# Patient Record
Sex: FEMALE | Race: AMERICAN INDIAN OR ALASKA NATIVE
[De-identification: names, ages, dates, MRNs, and addresses within clinical notes are randomized per-mention and may not be internally consistent; named-entity substitution may affect disease eponyms.]

---

## 2019-12-18 ENCOUNTER — HOSPITAL ENCOUNTER (EMERGENCY)
Dept: HOSPITAL 5 - ED | Age: 64
Discharge: HOME | End: 2019-12-18
Payer: MEDICAID

## 2019-12-18 VITALS — SYSTOLIC BLOOD PRESSURE: 165 MMHG | DIASTOLIC BLOOD PRESSURE: 77 MMHG

## 2019-12-18 DIAGNOSIS — E03.9: ICD-10-CM

## 2019-12-18 DIAGNOSIS — E11.65: Primary | ICD-10-CM

## 2019-12-18 DIAGNOSIS — I10: ICD-10-CM

## 2019-12-18 DIAGNOSIS — Z88.6: ICD-10-CM

## 2019-12-18 LAB
BASOPHILS # (AUTO): 0.1 K/MM3 (ref 0–0.1)
BASOPHILS NFR BLD AUTO: 0.8 % (ref 0–1.8)
BILIRUB UR QL STRIP: (no result)
BLOOD UR QL VISUAL: (no result)
BUN SERPL-MCNC: 12 MG/DL (ref 7–17)
BUN/CREAT SERPL: 17 %
CALCIUM SERPL-MCNC: 9.6 MG/DL (ref 8.4–10.2)
EOSINOPHIL # BLD AUTO: 0.1 K/MM3 (ref 0–0.4)
EOSINOPHIL NFR BLD AUTO: 1.3 % (ref 0–4.3)
HCT VFR BLD CALC: 39 % (ref 30.3–42.9)
HEMOLYSIS INDEX: 22
HGB BLD-MCNC: 12.8 GM/DL (ref 10.1–14.3)
LYMPHOCYTES # BLD AUTO: 2.5 K/MM3 (ref 1.2–5.4)
LYMPHOCYTES NFR BLD AUTO: 40.8 % (ref 13.4–35)
MCHC RBC AUTO-ENTMCNC: 33 % (ref 30–34)
MCV RBC AUTO: 81 FL (ref 79–97)
MONOCYTES # (AUTO): 0.5 K/MM3 (ref 0–0.8)
MONOCYTES % (AUTO): 8.1 % (ref 0–7.3)
PH UR STRIP: 5 [PH] (ref 5–7)
PLATELET # BLD: 240 K/MM3 (ref 140–440)
PROT UR STRIP-MCNC: (no result) MG/DL
RBC # BLD AUTO: 4.83 M/MM3 (ref 3.65–5.03)
RBC #/AREA URNS HPF: 4 /HPF (ref 0–6)
UROBILINOGEN UR-MCNC: < 2 MG/DL (ref ?–2)
WBC #/AREA URNS HPF: 8 /HPF (ref 0–6)

## 2019-12-18 PROCEDURE — 80048 BASIC METABOLIC PNL TOTAL CA: CPT

## 2019-12-18 PROCEDURE — 99283 EMERGENCY DEPT VISIT LOW MDM: CPT

## 2019-12-18 PROCEDURE — 81001 URINALYSIS AUTO W/SCOPE: CPT

## 2019-12-18 PROCEDURE — 96360 HYDRATION IV INFUSION INIT: CPT

## 2019-12-18 PROCEDURE — 82962 GLUCOSE BLOOD TEST: CPT

## 2019-12-18 PROCEDURE — 82805 BLOOD GASES W/O2 SATURATION: CPT

## 2019-12-18 PROCEDURE — 85025 COMPLETE CBC W/AUTO DIFF WBC: CPT

## 2019-12-18 PROCEDURE — 36415 COLL VENOUS BLD VENIPUNCTURE: CPT

## 2019-12-18 NOTE — EMERGENCY DEPARTMENT REPORT
ED General Adult HPI





- General


Chief complaint: Hyperglycemia


Stated complaint: BLOOD SUGAR LEVELS


Time Seen by Provider: 12/18/19 14:52


Source: patient


Mode of arrival: Ambulatory


Limitations: No Limitations





- History of Present Illness


Initial comments: 





Patient is a 64-year-old  female who is presenting with hyperglycemia.  

Patient states prior to arrival her blood glucose.  499.  Patient has had some 

fatigue urinary frequency and increased thirst.  Patient just started taking 

Lantus several days ago.  Patient did not compliant with her medication for 

approximately 1 year.  Patient started on his medication however the patient was

urged to come to the emergency department she started to feel ill.  Patient's 

patient is denying any fevers chills cough cold congestion at this time.





- Related Data


                                    Allergies











Allergy/AdvReac Type Severity Reaction Status Date / Time


 


acetaminophen [From Percocet] AdvReac  Nausea Verified 12/18/19 14:42


 


metformin AdvReac  Nausea Verified 12/18/19 14:42


 


oxycodone [From Percocet] AdvReac  Nausea Verified 12/18/19 14:42














ED Review of Systems


ROS: 


Stated complaint: BLOOD SUGAR LEVELS


Other details as noted in HPI





Comment: All other systems reviewed and negative





ED Past Medical Hx





- Past Medical History


Previous Medical History?: Yes


Hx Hypertension: Yes


Hx Diabetes: Yes


Additional medical history: Sarcoidosis, hypothyroidism





- Social History


Smoking Status: Never Smoker


Substance Use Type: None





ED Physical Exam





- General


Limitations: No Limitations


General appearance: alert, in no apparent distress





- Head


Head exam: Present: atraumatic, normocephalic





- Eye


Eye exam: Present: normal appearance.  Absent: PERRL, EOMI





- ENT


ENT exam: Present: mucous membranes moist





- Neck


Neck exam: Present: normal inspection





- Respiratory


Respiratory exam: Present: normal lung sounds bilaterally.  Absent: respiratory 

distress, wheezes, rales, rhonchi





- Cardiovascular


Cardiovascular Exam: Present: regular rate, normal rhythm, normal heart sounds. 

Absent: systolic murmur, diastolic murmur, rubs, gallop





- GI/Abdominal


GI/Abdominal exam: Present: soft, normal bowel sounds.  Absent: distended, 

tenderness, guarding, rebound





- Extremities Exam


Extremities exam: Present: normal inspection





- Back Exam


Back exam: Present: normal inspection





- Neurological Exam


Neurological exam: Present: alert, oriented X3





- Psychiatric


Psychiatric exam: Present: normal affect, normal mood





- Skin


Skin exam: Present: warm, dry, intact, normal color.  Absent: rash





ED Course





                                   Vital Signs











  12/18/19 12/18/19





  14:51 15:51


 


Temperature 97.6 F 


 


Pulse Rate 83 


 


Respiratory 18 16





Rate  


 


Blood Pressure 165/77 


 


O2 Sat by Pulse 100 





Oximetry  














ED Medical Decision Making





- Lab Data


Result diagrams: 


                                 12/18/19 15:06





                                 12/18/19 15:06








                                   Lab Results











  12/18/19 12/18/19 12/18/19 Range/Units





  14:54 15:00 15:06 


 


WBC    6.2  (4.5-11.0)  K/mm3


 


RBC    4.83  (3.65-5.03)  M/mm3


 


Hgb    12.8  (10.1-14.3)  gm/dl


 


Hct    39.0  (30.3-42.9)  %


 


MCV    81  (79-97)  fl


 


MCH    27 L  (28-32)  pg


 


MCHC    33  (30-34)  %


 


RDW    13.5  (13.2-15.2)  %


 


Plt Count    240  (140-440)  K/mm3


 


Lymph % (Auto)    40.8 H  (13.4-35.0)  %


 


Mono % (Auto)    8.1 H  (0.0-7.3)  %


 


Eos % (Auto)    1.3  (0.0-4.3)  %


 


Baso % (Auto)    0.8  (0.0-1.8)  %


 


Lymph #    2.5  (1.2-5.4)  K/mm3


 


Mono #    0.5  (0.0-0.8)  K/mm3


 


Eos #    0.1  (0.0-0.4)  K/mm3


 


Baso #    0.1  (0.0-0.1)  K/mm3


 


Seg Neutrophils %    49.0  (40.0-70.0)  %


 


Seg Neutrophils #    3.0  (1.8-7.7)  K/mm3


 


VBG pH     (7.320-7.420)  


 


Sodium     (137-145)  mmol/L


 


Potassium     (3.6-5.0)  mmol/L


 


Chloride     ()  mmol/L


 


Carbon Dioxide     (22-30)  mmol/L


 


Anion Gap     mmol/L


 


BUN     (7-17)  mg/dL


 


Creatinine     (0.7-1.2)  mg/dL


 


Estimated GFR     ml/min


 


BUN/Creatinine Ratio     %


 


Glucose     ()  mg/dL


 


POC Glucose  306 H    ()  


 


Calcium     (8.4-10.2)  mg/dL


 


Urine Color   Yellow   (Yellow)  


 


Urine Turbidity   Clear   (Clear)  


 


Urine pH   5.0   (5.0-7.0)  


 


Ur Specific Gravity   1.026   (1.003-1.030)  


 


Urine Protein   <15 mg/dl   (Negative)  mg/dL


 


Urine Glucose (UA)   >=500   (Negative)  mg/dL


 


Urine Ketones   Neg   (Negative)  mg/dL


 


Urine Blood   Sm   (Negative)  


 


Urine Nitrite   Neg   (Negative)  


 


Urine Bilirubin   Neg   (Negative)  


 


Urine Urobilinogen   < 2.0   (<2.0)  mg/dL


 


Ur Leukocyte Esterase   Neg   (Negative)  


 


Urine WBC (Auto)   8.0 H   (0.0-6.0)  /HPF


 


Urine RBC (Auto)   4.0   (0.0-6.0)  /HPF


 


U Epithel Cells (Auto)   4.0   (0-13.0)  /HPF














  12/18/19 12/18/19 12/18/19 Range/Units





  15:06 15:06 17:30 


 


WBC     (4.5-11.0)  K/mm3


 


RBC     (3.65-5.03)  M/mm3


 


Hgb     (10.1-14.3)  gm/dl


 


Hct     (30.3-42.9)  %


 


MCV     (79-97)  fl


 


MCH     (28-32)  pg


 


MCHC     (30-34)  %


 


RDW     (13.2-15.2)  %


 


Plt Count     (140-440)  K/mm3


 


Lymph % (Auto)     (13.4-35.0)  %


 


Mono % (Auto)     (0.0-7.3)  %


 


Eos % (Auto)     (0.0-4.3)  %


 


Baso % (Auto)     (0.0-1.8)  %


 


Lymph #     (1.2-5.4)  K/mm3


 


Mono #     (0.0-0.8)  K/mm3


 


Eos #     (0.0-0.4)  K/mm3


 


Baso #     (0.0-0.1)  K/mm3


 


Seg Neutrophils %     (40.0-70.0)  %


 


Seg Neutrophils #     (1.8-7.7)  K/mm3


 


VBG pH   7.319 L   (7.320-7.420)  


 


Sodium  135 L    (137-145)  mmol/L


 


Potassium  3.8    (3.6-5.0)  mmol/L


 


Chloride  96.8 L    ()  mmol/L


 


Carbon Dioxide  22    (22-30)  mmol/L


 


Anion Gap  20    mmol/L


 


BUN  12    (7-17)  mg/dL


 


Creatinine  0.7    (0.7-1.2)  mg/dL


 


Estimated GFR  > 60    ml/min


 


BUN/Creatinine Ratio  17    %


 


Glucose  270 H    ()  mg/dL


 


POC Glucose    155 H  ()  


 


Calcium  9.6    (8.4-10.2)  mg/dL


 


Urine Color     (Yellow)  


 


Urine Turbidity     (Clear)  


 


Urine pH     (5.0-7.0)  


 


Ur Specific Gravity     (1.003-1.030)  


 


Urine Protein     (Negative)  mg/dL


 


Urine Glucose (UA)     (Negative)  mg/dL


 


Urine Ketones     (Negative)  mg/dL


 


Urine Blood     (Negative)  


 


Urine Nitrite     (Negative)  


 


Urine Bilirubin     (Negative)  


 


Urine Urobilinogen     (<2.0)  mg/dL


 


Ur Leukocyte Esterase     (Negative)  


 


Urine WBC (Auto)     (0.0-6.0)  /HPF


 


Urine RBC (Auto)     (0.0-6.0)  /HPF


 


U Epithel Cells (Auto)     (0-13.0)  /HPF














- Medical Decision Making





Laboratory studies were ordered to ensure that the patient's hyperglycemia was 

not accompanied with a low bicarbonate and a high anion gap.  Patient was ruled 

out for having diabetic ketoacidosis.  Patient was hydrated with 1500 mL of 

normal saline the patient's blood glucose decreased to 155.  His level is 

acceptable for the patient to be discharged home.  Since the patient just 

started her insulin regimen patient encouraged to continue with the doses that 

she is on at this time.  Patient will be discharged home in stable condition.


Critical care attestation.: 


If time is entered above; I have spent that time in minutes in the direct care 

of this critically ill patient, excluding procedure time.








ED Disposition


Clinical Impression: 


 Hyperglycemia





Disposition: DC-01 TO HOME OR SELFCARE


Is pt being admited?: No


Does the pt Need Aspirin: No


Condition: Stable


Instructions:  Diabetic Hyperglycemia (ED)


Referrals: 


PRIMARY CARE,MD [Primary Care Provider] - 3-5 Days


Time of Disposition: 17:27

## 2019-12-18 NOTE — EMERGENCY DEPARTMENT REPORT
Blank Doc





- Documentation


Documentation: 





64-year-old female that presents with fatigue, body aches, weakness, and uncon

trolled glucose levels.





This initial assessment/diagnostic orders/clinical plan/treatment(s) is/are 

subject to change based on patient's health status, clinical progression and re-

assessment by fellow clinical providers in the ED.  Further treatment and workup

at subsequent clinical providers discretion.  Patient/guardians urged not to 

elope from the ED as their condition may be serious if not clinically assessed 

and managed.  Initial orders include:


1- Patient sent to MAIN ED for further evaluation and treatment


2- labs


3- UA

## 2020-06-03 ENCOUNTER — HOSPITAL ENCOUNTER (OUTPATIENT)
Dept: HOSPITAL 5 - MRI | Age: 65
Discharge: HOME | End: 2020-06-03
Attending: ORTHOPAEDIC SURGERY
Payer: MEDICARE

## 2020-06-03 DIAGNOSIS — M25.511: Primary | ICD-10-CM

## 2020-06-03 NOTE — MAGNETIC RESONANCE REPORT
MR UE joint RT wo con



INDICATION / CLINICAL INFORMATION:

MAIN: PAIN IN RIGHT SHOULDER, LIMITED ROM.



TECHNIQUE:

Multiplanar, multisequence MR images were obtained.



COMPARISON:

None available.



FINDINGS:

Supraspinatus tendinosis is present without evidence of a rotator cuff tear. No significant encroachm
ent is seen. The labrum, biceps tendon and subscapularis tendon are normal.



IMPRESSION:

Supraspinatus tendinosis without evidence of a rotator cuff tear. Otherwise negative MRI of the right
 shoulder



Signer Name: Hira Burkett MD FACR 

Signed: 6/3/2020 12:40 PM

Workstation Name: [id number]